# Patient Record
Sex: FEMALE | Race: WHITE | NOT HISPANIC OR LATINO | Employment: FULL TIME | URBAN - NONMETROPOLITAN AREA
[De-identification: names, ages, dates, MRNs, and addresses within clinical notes are randomized per-mention and may not be internally consistent; named-entity substitution may affect disease eponyms.]

---

## 2018-10-18 ENCOUNTER — OFFICE VISIT CONVERTED (OUTPATIENT)
Dept: FAMILY MEDICINE CLINIC | Age: 63
End: 2018-10-18
Attending: NURSE PRACTITIONER

## 2019-10-21 ENCOUNTER — HOSPITAL ENCOUNTER (OUTPATIENT)
Dept: OTHER | Facility: HOSPITAL | Age: 64
Discharge: HOME | End: 2019-10-21
Attending: NURSE PRACTITIONER

## 2019-10-21 ENCOUNTER — OFFICE VISIT CONVERTED (OUTPATIENT)
Dept: FAMILY MEDICINE CLINIC | Age: 64
End: 2019-10-21
Attending: NURSE PRACTITIONER

## 2019-10-21 LAB
25(OH)D3 SERPL-MCNC: 15.5 NG/ML (ref 30–100)
ALBUMIN SERPL-MCNC: 4.6 G/DL (ref 3.5–5)
ALBUMIN/GLOB SERPL: 1.7 {RATIO} (ref 1.4–2.6)
ALP SERPL-CCNC: 75 U/L (ref 43–160)
ALT SERPL-CCNC: 20 U/L (ref 10–40)
ANION GAP SERPL CALC-SCNC: 15 MMOL/L (ref 8–19)
AST SERPL-CCNC: 26 U/L (ref 15–50)
BASOPHILS # BLD MANUAL: 0.04 10*3/UL (ref 0–0.2)
BASOPHILS NFR BLD MANUAL: 0.8 % (ref 0–3)
BILIRUB SERPL-MCNC: 0.58 MG/DL (ref 0.2–1.3)
BUN SERPL-MCNC: 13 MG/DL (ref 5–25)
BUN/CREAT SERPL: 21 {RATIO} (ref 6–20)
CALCIUM SERPL-MCNC: 9.5 MG/DL (ref 8.7–10.4)
CHLORIDE SERPL-SCNC: 105 MMOL/L (ref 99–111)
CHOLEST SERPL-MCNC: 194 MG/DL (ref 107–200)
CHOLEST/HDLC SERPL: 3.8 {RATIO} (ref 3–6)
CONV CO2: 25 MMOL/L (ref 22–32)
CONV TOTAL PROTEIN: 7.3 G/DL (ref 6.3–8.2)
CREAT UR-MCNC: 0.62 MG/DL (ref 0.5–0.9)
DEPRECATED RDW RBC AUTO: 38.7 FL
EOSINOPHIL # BLD MANUAL: 0.12 10*3/UL (ref 0–0.7)
EOSINOPHIL NFR BLD MANUAL: 2.4 % (ref 0–7)
ERYTHROCYTE [DISTWIDTH] IN BLOOD BY AUTOMATED COUNT: 11.4 % (ref 11.5–14.5)
GFR SERPLBLD BASED ON 1.73 SQ M-ARVRAT: >60 ML/MIN/{1.73_M2}
GLOBULIN UR ELPH-MCNC: 2.7 G/DL (ref 2–3.5)
GLUCOSE SERPL-MCNC: 102 MG/DL (ref 65–99)
GRANS (ABSOLUTE): 2.91 10*3/UL (ref 2–8)
GRANS: 58.7 % (ref 30–85)
HBA1C MFR BLD: 13.5 G/DL (ref 12–16)
HCT VFR BLD AUTO: 40 % (ref 37–47)
HDLC SERPL-MCNC: 51 MG/DL (ref 40–60)
IMM GRANULOCYTES # BLD: 0 10*3/UL (ref 0–0.54)
IMM GRANULOCYTES NFR BLD: 0 % (ref 0–0.43)
LDLC SERPL CALC-MCNC: 128 MG/DL (ref 70–100)
LYMPHOCYTES # BLD MANUAL: 1.46 10*3/UL (ref 1–5)
LYMPHOCYTES NFR BLD MANUAL: 8.7 % (ref 3–10)
MCH RBC QN AUTO: 30.8 PG (ref 27–31)
MCHC RBC AUTO-ENTMCNC: 33.8 G/DL (ref 33–37)
MCV RBC AUTO: 91.3 FL (ref 81–99)
MONOCYTES # BLD AUTO: 0.43 10*3/UL (ref 0.2–1.2)
OSMOLALITY SERPL CALC.SUM OF ELEC: 292 MOSM/KG (ref 273–304)
PLATELET # BLD AUTO: 261 10*3/UL (ref 130–400)
PMV BLD AUTO: 9.6 FL (ref 7.4–10.4)
POTASSIUM SERPL-SCNC: 3.9 MMOL/L (ref 3.5–5.3)
RBC # BLD AUTO: 4.38 10*6/UL (ref 4.2–5.4)
SODIUM SERPL-SCNC: 141 MMOL/L (ref 135–147)
TRIGL SERPL-MCNC: 73 MG/DL (ref 40–150)
TSH SERPL-ACNC: 1.38 M[IU]/L (ref 0.27–4.2)
VARIANT LYMPHS NFR BLD MANUAL: 29.4 % (ref 20–45)
VLDLC SERPL-MCNC: 15 MG/DL (ref 5–37)
WBC # BLD AUTO: 4.96 10*3/UL (ref 4.8–10.8)

## 2019-10-23 LAB
CONV LAST MENSTURAL PERIOD: NORMAL
SPECIMEN SOURCE: NORMAL
SPECIMEN SOURCE: NORMAL
THIN PREP CVX: NORMAL

## 2019-11-07 ENCOUNTER — OFFICE VISIT CONVERTED (OUTPATIENT)
Dept: PODIATRY | Facility: CLINIC | Age: 64
End: 2019-11-07
Attending: PODIATRIST

## 2019-11-07 ENCOUNTER — CONVERSION ENCOUNTER (OUTPATIENT)
Dept: PODIATRY | Facility: CLINIC | Age: 64
End: 2019-11-07

## 2019-11-07 ENCOUNTER — HOSPITAL ENCOUNTER (OUTPATIENT)
Dept: OTHER | Facility: HOSPITAL | Age: 64
Discharge: HOME | End: 2019-11-07
Attending: PODIATRIST

## 2019-11-15 ENCOUNTER — HOSPITAL ENCOUNTER (OUTPATIENT)
Dept: OTHER | Facility: HOSPITAL | Age: 64
Discharge: HOME | End: 2019-11-15
Attending: NURSE PRACTITIONER

## 2020-01-08 ENCOUNTER — OFFICE VISIT CONVERTED (OUTPATIENT)
Dept: FAMILY MEDICINE CLINIC | Age: 65
End: 2020-01-08
Attending: NURSE PRACTITIONER

## 2020-01-08 ENCOUNTER — HOSPITAL ENCOUNTER (OUTPATIENT)
Dept: OTHER | Facility: HOSPITAL | Age: 65
Discharge: HOME | End: 2020-01-08
Attending: NURSE PRACTITIONER

## 2020-01-08 LAB
APPEARANCE UR: CLEAR
BILIRUB UR QL: NEGATIVE
COLOR UR: YELLOW
CONV BACTERIA: NEGATIVE
CONV COLLECTION SOURCE (UA): ABNORMAL
CONV UROBILINOGEN IN URINE BY AUTOMATED TEST STRIP: 0.2 {EHRLICHU}/DL (ref 0.1–1)
GLUCOSE UR QL: NEGATIVE MG/DL
HGB UR QL STRIP: ABNORMAL
KETONES UR QL STRIP: NEGATIVE MG/DL
LEUKOCYTE ESTERASE UR QL STRIP: ABNORMAL
NITRITE UR QL STRIP: NEGATIVE
PH UR STRIP.AUTO: 6.5 [PH] (ref 5–8)
PROT UR QL: NEGATIVE MG/DL
RBC #/AREA URNS HPF: ABNORMAL /[HPF]
SP GR UR: 1.01 (ref 1–1.03)
WBC #/AREA URNS HPF: ABNORMAL /[HPF]

## 2020-01-10 LAB — BACTERIA UR CULT: ABNORMAL

## 2020-01-22 ENCOUNTER — HOSPITAL ENCOUNTER (OUTPATIENT)
Dept: OTHER | Facility: HOSPITAL | Age: 65
Discharge: HOME | End: 2020-01-22
Attending: NURSE PRACTITIONER

## 2020-01-24 ENCOUNTER — HOSPITAL ENCOUNTER (OUTPATIENT)
Dept: OTHER | Facility: HOSPITAL | Age: 65
Discharge: HOME | End: 2020-01-24
Attending: NURSE PRACTITIONER

## 2020-01-24 LAB
APPEARANCE UR: CLEAR
BACTERIA UR QL AUTO: ABNORMAL
BILIRUB UR QL: NEGATIVE
CASTS URNS QL MICRO: ABNORMAL /[LPF]
COLOR UR: YELLOW
CONV LEUKOCYTE ESTERASE: NEGATIVE
CONV UROBILINOGEN IN URINE BY AUTOMATED TEST STRIP: 0.2 {EHRLICHU}/DL (ref 0.1–1)
EPI CELLS #/AREA URNS HPF: ABNORMAL /[HPF]
GLUCOSE 24H UR-MCNC: NEGATIVE MG/DL
HGB UR QL STRIP: ABNORMAL
KETONES UR QL STRIP: NEGATIVE MG/DL
MUCOUS THREADS URNS QL MICRO: ABNORMAL
NITRITE UR-MCNC: NEGATIVE MG/ML
PH UR STRIP.AUTO: 7 [PH] (ref 5–8)
PROT UR-MCNC: NEGATIVE MG/DL
RBC # BLD AUTO: ABNORMAL /[HPF]
SP GR UR STRIP: 1.01 (ref 1–1.03)
SPECIMEN SOURCE: ABNORMAL
UNIDENT CRYS URNS QL MICRO: ABNORMAL /[HPF]
WBC #/AREA URNS HPF: ABNORMAL /[HPF]

## 2020-01-27 ENCOUNTER — DOCUMENTATION (OUTPATIENT)
Dept: OBSTETRICS AND GYNECOLOGY | Facility: CLINIC | Age: 65
End: 2020-01-27

## 2020-01-27 ENCOUNTER — TELEPHONE (OUTPATIENT)
Dept: OBSTETRICS AND GYNECOLOGY | Facility: CLINIC | Age: 65
End: 2020-01-27

## 2020-01-27 ENCOUNTER — OFFICE VISIT (OUTPATIENT)
Dept: OBSTETRICS AND GYNECOLOGY | Facility: CLINIC | Age: 65
End: 2020-01-27

## 2020-01-27 VITALS
WEIGHT: 152.8 LBS | SYSTOLIC BLOOD PRESSURE: 122 MMHG | DIASTOLIC BLOOD PRESSURE: 82 MMHG | BODY MASS INDEX: 26.09 KG/M2 | HEIGHT: 64 IN

## 2020-01-27 DIAGNOSIS — N95.2 VAGINAL ATROPHY: ICD-10-CM

## 2020-01-27 DIAGNOSIS — N95.1 MENOPAUSAL SYMPTOMS: Primary | ICD-10-CM

## 2020-01-27 DIAGNOSIS — N84.1 ENDOCERVICAL POLYP: ICD-10-CM

## 2020-01-27 PROCEDURE — 99203 OFFICE O/P NEW LOW 30 MIN: CPT | Performed by: OBSTETRICS & GYNECOLOGY

## 2020-01-27 RX ORDER — CHOLECALCIFEROL (VITAMIN D3) 1250 MCG
1 CAPSULE ORAL WEEKLY
COMMUNITY
Start: 2020-01-07 | End: 2021-01-28 | Stop reason: DRUGHIGH

## 2020-01-27 NOTE — TELEPHONE ENCOUNTER
Spoke with patient and advised her that we need to have the mammogram report faxed to our office.  Also, patient was advised that she should use the premarin tonight and Wednesday and she should be able to have intercourse before using the cream and the day after she shouldn't have any problems.  Patient verbalized understanding and had no questions at this time.

## 2020-01-27 NOTE — PROGRESS NOTES
History and Physical    Chief Complaint: An endocervical polyp    Sayra Brock is a 64 y.o., , who presented to the office on 2020 with a history of an endocervical polyp.  She also had significant symptoms of vaginal atrophy and was started on Premarin vaginal cream.  She has had 2 vaginal deliveries and 2  sections.  She is scheduled for hysteroscopy/D&C/polypectomy with the MyoSure device.  I have explained the procedures to the patient including the surgical risks of uterine perforation, bleeding, infection, and anesthetic risks.  She voiced understanding of these risks.  Informed consent was signed.    Past Medical History:   Diagnosis Date   • Menopause    • Vitamin D deficiency        Allergies: Patient has no known allergies.    Medications:   Current Outpatient Medications:   •  Cholecalciferol (VITAMIN D3) 1.25 MG (47044 UT) capsule, Take 1 capsule by mouth 1 (One) Time Per Week., Disp: , Rfl:   •  conjugated estrogens (PREMARIN) 0.625 MG/GM vaginal cream, Insert 1.0 grams intravaginally, twice a week, Disp: 30 g, Rfl: 3    Previous Surgery:   Past Surgical History:   Procedure Laterality Date   • BREAST BIOPSY     •  SECTION      X 2   • D&C WITH SUCTION         Review of Systems  A comprehensive review of systems was negative.  Constitutional: negative for fever, chills, activity change, appetite change, fatigue and unexpected weight change.  Respiratory: negative  Cardiovascular: negative  Gastrointestinal: positive for incontinence of stool  Genitourinary:vaginal dryness  Musculoskeletal:negative  Behavioral/Psych: negative      Social History     Tobacco Use   • Smoking status: Former Smoker   • Smokeless tobacco: Never Used   Substance Use Topics   • Alcohol use: Yes     Comment: occasional       Recent Vitals       2020             BP:  122/82    Weight:  69.3 kg (152 lb 12.8 oz)    BMI (Calculated):  26.2          Physical Exam  General:  alert; cooperative; well  developed; well nourished   Skin:  No suspicious lesions seen   Thyroid: normal to inspection and palpation   Lungs:  clear to auscultation bilaterally   Heart:  regular rate and rhythm, S1, S2 normal, no murmur, click, rub or gallop   Breasts:  Not performed.   Abdomen: soft, non-tender; no masses  no umbilical or inguinal hernias are present  no hepato-splenomegaly   Pelvis: Clinical staff was present for exam  External genitalia:  normal appearance of the external genitalia including Bartholin's and [place]inos[place]'s glands.  Vaginal:  normal pink mucosa without prolapse or lesions.  Cervix:  endocervical polyp  Uterus:  normal size, shape and consistency. anteverted;  Adnexa:  non palpable bilaterally.  Rectal:  anus visually normal appearing. recto-vaginal exam unremarkable and confirms findings;         The surgical risks of uterine perforation, bleeding, infection and anesthestic risks were explained to the patient and she voiced understanding. Informed consent was signed.    No contraindications to planned surgery were detected      Impression: 1) Endocervical polyp        Plan: 1) Hysteroscopy/D&C/Polypectomy (Myosure Reach)      *Please note that portions of this documentation may have been completed with a voice recognition program.  Efforts were made to edit this dictation, but occasional words may have been mistranscribed.  This note was electronically signed.    ALANA Laird MD  January 27, 2020  4:36 PM

## 2020-01-27 NOTE — PROGRESS NOTES
"   Chief Complaint   Patient presents with   • Gynecologic Exam     endocervical polyp   • Menopause     c/o menopausal symptoms, vaginal dryness and itching       Sayra Brock is a 64 y.o. year old  presenting to be seen for a new gynecologic visit with complaints of vaginal dryness and vulvar irritation as well as a history of an endocervical polyp.  She has had hot flashes, and night sweats off and on since age 53.  She is not treated with estrogen/progestin replacement therapy.  Her vaginal dryness and irritation has become progressively worse.  She has had 2 vaginal deliveries and 2  sections.  She has had a suction D&C for an incomplete AB.  She has had a benign breast biopsy.  She denies urinary symptoms.  She has incomplete expelling of stool.    Social History     Substance and Sexual Activity   Sexual Activity Yes   • Partners: Male   • Birth control/protection: Post-menopausal     SCREENING TESTS    Year 2012   Age                         PAP        \"Neg\"                 HPV high risk                         Mammogram        \"benign\"                 MADDIE score                         Breast MRI                         Lipids                         Vitamin D                         Colonoscopy                         DEXA  Frax (hip/any)                         Ovarian Screen                             No Additional Complaints Reported    The following portions of the patient's history were reviewed and updated as appropriate:vital signs and   She  has a past medical history of Menopause and Vitamin D deficiency.  She does not have any pertinent problems on file.  She  has a past surgical history that includes d&c with suction;  section; and Breast biopsy.  Her family history includes Breast cancer in her maternal aunt and mother.  She  reports that she has quit smoking. She has " "never used smokeless tobacco. She reports that she drinks alcohol. She reports that she does not use drugs.  Current Outpatient Medications   Medication Sig Dispense Refill   • Cholecalciferol (VITAMIN D3) 1.25 MG (87993 UT) capsule Take 1 capsule by mouth 1 (One) Time Per Week.     • conjugated estrogens (PREMARIN) 0.625 MG/GM vaginal cream Insert 1.0 grams intravaginally, twice a week 30 g 3     No current facility-administered medications for this visit.      She has No Known Allergies..        Review of Systems  A comprehensive review of systems was done.  Constitutional: negative for fever, chills, activity change, appetite change, fatigue and unexpected weight change.  Respiratory: negative  Cardiovascular: negative  Gastrointestinal: positive for incontinence of stool  Genitourinary:positive for vaginal dryness  Musculoskeletal:negative  Behavioral/Psych: negative          /82   Ht 162.6 cm (64\")   Wt 69.3 kg (152 lb 12.8 oz)   Breastfeeding No   BMI 26.23 kg/m²     Physical Exam    General:  alert; cooperative; well developed; well nourished   Skin:  No suspicious lesions seen   Thyroid: normal to inspection and palpation   Lungs:  clear to auscultation bilaterally   Heart:  regular rate and rhythm, S1, S2 normal, no murmur, click, rub or gallop   Breasts:  Not performed.  Not desired   Abdomen: soft, non-tender; no masses  no umbilical or inguinal hernias are present  no hepato-splenomegaly   Pelvis: Clinical staff was present for exam  External genitalia:  normal appearance of the external genitalia including Bartholin's and St[place]'s glands.  Vaginal:  atrophic mucosal changes are present;  Cervix:  Endocervical polyp present  Uterus:  normal size, shape and consistency. anteverted;  Adnexa:  non palpable bilaterally.  Rectal:  external hemorrhoids present;     Lab Review   No data reviewed    Imaging   No data reviewed    Medical counseling was given to patient for the following topics: " diagnostic results, instructions for management, risk factor reductions, prognosis, impressions, risks and benefits of treatment options and importance of treatment compliance . Total time of the encounter was 32 minute(s) and 21 minute(s)  was spent in face to face counseling.  I have counseled the patient that she has severe vaginal atrophy and has no evidence of vaginitis.  I have recommended that she use a local estrogen product and she prefers to use a cream.  She has questions about the safety of this product and I have answered those.  I have advised her that the package insert information is based on systemic estrogen therapy rather than local vaginal estrogen therapy.  I have counseled her of the breast and cardiovascular risks of systemic estrogen therapy.  I have recommended that she use estrogen cream intravaginally twice a week.  I have counseled her that it may take 6-8 weeks before she notices improvement.  I have advised her to continue on this medication indefinitely.  I have counseled her that she has an endocervical polyp.  I have counseled her about hysteroscopy with D&C and polypectomy.  I have advised her of the surgical risks of uterine perforation, bleeding, infection, and anesthetic risks.  She voiced understanding of these risks and desires to be scheduled at [facility].          ASSESSMENT  Problems Addressed this Visit        Genitourinary    Vaginal atrophy    Relevant Medications    conjugated estrogens (PREMARIN) 0.625 MG/GM vaginal cream    Endocervical polyp      Other Visit Diagnoses     Menopausal symptoms    -  Primary           Substance History:    reports that she has quit smoking. She has never used smokeless tobacco.    reports that she drinks alcohol.    reports that she does not use drugs.    Substance use counseling is not indicated based on patient history.  She indicates that her alcohol intake is social, and controlled.      PLAN    Medications prescribed this encounter:     New Medications Ordered This Visit   Medications   • conjugated estrogens (PREMARIN) 0.625 MG/GM vaginal cream     Sig: Insert 1.0 grams intravaginally, twice a week     Dispense:  30 g     Refill:  3   · Monthly self breast assessment and annual breast imaging  · The patient will be scheduled for hysteroscopy/D&C/endocervical and or endometrial polypectomy with the MyoSure device.  Pamphlets were given about these procedures.  · Follow up for surgery on 1/31/2020  · Calcium, 600 mg/ Vit. D, 400 IU daily     *Please note that portions of this documentation may have been completed with a voice recognition program.  Efforts were made to edit this dictation, but occasional words may have been mistranscribed.     This note was electronically signed.    ALANA Laird MD  January 27, 2020  10:17 AM

## 2020-01-27 NOTE — TELEPHONE ENCOUNTER
I had asked the patient to have her mammogram report Faxed to our office.  I would recommend that she insert Premarin cream tonight and Wednesday night.  Regarding intercourse, she may insert Premarin cream after having intercourse.  She may have intercourse the day after inserting the cream without any problems

## 2020-01-27 NOTE — TELEPHONE ENCOUNTER
Patient called and wanted to know exactly what Dr. Laird was needing her to ask the mammography office.  She wasn't sure exactly what he was wanting to know.  She was also wanting to know how long to use the premarin cream.  How long should she wait to have intercourse after starting the cream.  Since she is supposed to use the cream every Monday and Thursday should she do a different day this week since she has to skip using it Thursday because of her surgery on Friday.

## 2020-01-28 ENCOUNTER — TELEPHONE (OUTPATIENT)
Dept: OBSTETRICS AND GYNECOLOGY | Facility: CLINIC | Age: 65
End: 2020-01-28

## 2020-01-28 NOTE — TELEPHONE ENCOUNTER
Spoke with patient and she wanted to know if she should stop using her fungal ointment on her feet the night before her surgery and if she could use some preparation H before her surgery.    Please advise.

## 2020-01-28 NOTE — TELEPHONE ENCOUNTER
The patient may use the antifungal ointment on her feet the night prior to surgery.  She may use Preparation H as well.

## 2020-01-28 NOTE — TELEPHONE ENCOUNTER
Spoke with patient and advised her that her surgery would be on Friday 1/31/2020 at 0830.  Patient verbalized understanding.

## 2020-01-29 NOTE — TELEPHONE ENCOUNTER
Spoke with patient and advised her that she was able to use the antifungal cream and the Preparation H the night before surgery.  Patient verbalized understanding and had no questions at this time.

## 2020-01-31 ENCOUNTER — OUTSIDE FACILITY SERVICE (OUTPATIENT)
Dept: OBSTETRICS AND GYNECOLOGY | Facility: CLINIC | Age: 65
End: 2020-01-31

## 2020-01-31 ENCOUNTER — LAB REQUISITION (OUTPATIENT)
Dept: LAB | Facility: HOSPITAL | Age: 65
End: 2020-01-31

## 2020-01-31 DIAGNOSIS — N84.1 POLYP OF CERVIX UTERI: ICD-10-CM

## 2020-01-31 PROCEDURE — 88305 TISSUE EXAM BY PATHOLOGIST: CPT | Performed by: OBSTETRICS & GYNECOLOGY

## 2020-01-31 PROCEDURE — 58558 HYSTEROSCOPY BIOPSY: CPT | Performed by: OBSTETRICS & GYNECOLOGY

## 2020-02-04 LAB
CYTO UR: NORMAL
LAB AP CASE REPORT: NORMAL
LAB AP CLINICAL INFORMATION: NORMAL
LAB AP DIAGNOSIS COMMENT: NORMAL
PATH REPORT.FINAL DX SPEC: NORMAL
PATH REPORT.GROSS SPEC: NORMAL

## 2020-02-27 ENCOUNTER — OFFICE VISIT (OUTPATIENT)
Dept: OBSTETRICS AND GYNECOLOGY | Facility: CLINIC | Age: 65
End: 2020-02-27

## 2020-02-27 VITALS
SYSTOLIC BLOOD PRESSURE: 150 MMHG | DIASTOLIC BLOOD PRESSURE: 84 MMHG | WEIGHT: 154 LBS | HEIGHT: 64 IN | BODY MASS INDEX: 26.29 KG/M2

## 2020-02-27 DIAGNOSIS — N95.2 VAGINAL ATROPHY: ICD-10-CM

## 2020-02-27 DIAGNOSIS — Z09 POSTOPERATIVE FOLLOW-UP: Primary | ICD-10-CM

## 2020-02-27 PROCEDURE — 99024 POSTOP FOLLOW-UP VISIT: CPT | Performed by: OBSTETRICS & GYNECOLOGY

## 2020-02-27 NOTE — PROGRESS NOTES
Chief Complaint   Patient presents with   • Post-op       Sayra Brock is a 64 y.o. year old  presenting to be seen for her post-operative visit following a hysteroscopy/MyoSure D&C with an endocervical polypectomy and endometrial polypectomy.  Both polyps were benign.    Procedure:  Hysteroscopy/D & C, Uterine Polypectomy and Endocervical polypectomy    ROS:  A comprehensive review of systems was negative.  Constitutional: negative for fever, chills, activity change, appetite change, fatigue and unexpected weight change.  Respiratory: negative   Cardiovascular: negative  Gastrointestinal: negative  Genitourinary:negative  Musculoskeletal:negative  Behavioral/Psych: negative      Symptoms:  Nausea/Vomiting    No, Normal Bowel Function  Yes, Normal Urinary Function  Yes and Abnormal Discharge   No    No bleeding        Physical Exam:  Lungs:  Normal expansion.  Clear to auscultation.  No rales, rhonchi, or wheezing.  Abdomen:  Soft, non-tender, normal bowel sounds; no bruits, organomegaly or masses.   Pelvic:  Clinical staff was present for exam  External genitalia:  normal appearance of the external genitalia including Bartholin's and [place]'s glands.  Vaginal:  normal pink mucosa without prolapse or lesions.  Cervix:  normal appearance.  Uterus:  normal size, shape and consistency. anteverted;  Adnexa:  non palpable bilaterally.          Impression: 1) S/P Hysteroscopy/D&C/Polypectomies for benign endocervical and endometrial polyps       PLAN:  1. Follow up: 11 month(s) for AG  2. Continue using Premarin vaginal cream, 0.5 g intravaginally twice a week  *Please note that portions of this documentation may have been completed with a voice recognition program.  Efforts were made to edit this dictation, but occasional words may have been mistranscribed.      This note was electronically signed.    ALANA Laird MD  2020  2:06 PM

## 2020-03-06 ENCOUNTER — TELEPHONE (OUTPATIENT)
Dept: OBSTETRICS AND GYNECOLOGY | Facility: CLINIC | Age: 65
End: 2020-03-06

## 2020-03-06 NOTE — TELEPHONE ENCOUNTER
Patient has been informed that she may continue using 1 gram of Premarin vaginal cream 2 X week.  A new prescription with these instructions has been sent to [facility].  She will use OTC hydrocortisone cream 1% PRN to vulva and will come in if vulvar itching/irritation persists.  She has been told that if she needs Premarin vaginal cream before she is allowed to refill, due to incorrect prescription from our office, that we would supply her with samples.  The patient understands and will call if she needs to come in for an exam.

## 2020-03-06 NOTE — TELEPHONE ENCOUNTER
May continue using Premarin vaginal cream and a dose of 1.0 g intravaginally twice a week rather than 0.5 g.  I would recommend that she get 1% hydrocortisone cream to use externally on a daily basis.  If the external irritation does not resolve the patient should be seen in the office.

## 2020-03-06 NOTE — TELEPHONE ENCOUNTER
Patient calls today questioning directions/dosage on her most recent prescription for Premarin vaginal cream.  The patient has been using 1 gram by vaginal route 2 X week.  Her new prescription was sent to [facility] with directions 0.5 grams by vaginal route 2 X week.  The patient has not started the decreased dose and wonders if this is correct?    She is also complaining of some external vulvar itching.  She has not used any medication for this.  No increased vaginal discharge.  ? OTC hydrocortisone cream PRN?    Please advise.

## 2020-08-26 ENCOUNTER — TELEPHONE (OUTPATIENT)
Dept: OBSTETRICS AND GYNECOLOGY | Facility: CLINIC | Age: 65
End: 2020-08-26

## 2020-08-26 RX ORDER — ESTRADIOL 0.1 MG/G
1 CREAM VAGINAL 2 TIMES WEEKLY
Qty: 42.5 G | Refills: 2 | Status: SHIPPED | OUTPATIENT
Start: 2020-08-27 | End: 2021-08-31 | Stop reason: SDUPTHER

## 2020-08-26 NOTE — TELEPHONE ENCOUNTER
Patient's insurance has advised her that it would be cheaper for her to use estradiol vaginal cream in place of Premarin vaginal cream, and the patient calls today to request that this change be made.

## 2020-08-27 ENCOUNTER — TELEPHONE (OUTPATIENT)
Dept: OBSTETRICS AND GYNECOLOGY | Facility: CLINIC | Age: 65
End: 2020-08-27

## 2020-08-27 NOTE — TELEPHONE ENCOUNTER
Patient called yesterday and requested we change her from Premarin vaginal cream to estradiol vaginal cream.  This was recommended to the patient by [facility].  A prescription for estradiol vaginal cream was sent electronically to [facility] yesterday.  Today [facility] calls and requests a call back.  I spoke with Fam, who was asking about Premarin cream.  I let him know that we sent in a new prescription yesterday for estradiol vaginal cream.  He states that he sees that prescription, will cancel the prescription for Premarin vaginal cream, and will fill the estradiol vaginal cream.  The patient has been informed.

## 2021-01-28 ENCOUNTER — OFFICE VISIT (OUTPATIENT)
Dept: OBSTETRICS AND GYNECOLOGY | Facility: CLINIC | Age: 66
End: 2021-01-28

## 2021-01-28 VITALS
DIASTOLIC BLOOD PRESSURE: 80 MMHG | BODY MASS INDEX: 28.17 KG/M2 | SYSTOLIC BLOOD PRESSURE: 128 MMHG | HEIGHT: 64 IN | WEIGHT: 165 LBS

## 2021-01-28 DIAGNOSIS — Z78.0 MENOPAUSE: Primary | ICD-10-CM

## 2021-01-28 DIAGNOSIS — Z01.419 ENCOUNTER FOR GYNECOLOGICAL EXAMINATION WITHOUT ABNORMAL FINDING: ICD-10-CM

## 2021-01-28 DIAGNOSIS — R30.0 DYSURIA: ICD-10-CM

## 2021-01-28 DIAGNOSIS — N95.2 VAGINAL ATROPHY: ICD-10-CM

## 2021-01-28 PROBLEM — N84.1 ENDOCERVICAL POLYP: Status: RESOLVED | Noted: 2020-01-27 | Resolved: 2021-01-28

## 2021-01-28 LAB
BILIRUB UR QL STRIP: NEGATIVE
CLARITY UR: CLEAR
COLOR UR: YELLOW
GLUCOSE UR STRIP-MCNC: NEGATIVE MG/DL
HGB UR QL STRIP.AUTO: NEGATIVE
KETONES UR QL STRIP: NEGATIVE
LEUKOCYTE ESTERASE UR QL STRIP.AUTO: NEGATIVE
NITRITE UR QL STRIP: NEGATIVE
PH UR STRIP.AUTO: 7 [PH] (ref 5–8)
PROT UR QL STRIP: NEGATIVE
SP GR UR STRIP: 1.01 (ref 1–1.03)
UROBILINOGEN UR QL STRIP: NORMAL

## 2021-01-28 PROCEDURE — 81003 URINALYSIS AUTO W/O SCOPE: CPT | Performed by: OBSTETRICS & GYNECOLOGY

## 2021-01-28 PROCEDURE — 99396 PREV VISIT EST AGE 40-64: CPT | Performed by: OBSTETRICS & GYNECOLOGY

## 2021-01-28 NOTE — PROGRESS NOTES
Chief Complaint   Patient presents with   • Annual Exam     ? occasional spotting?  unsure where bleeding is coming from.  patient desires cath u/a today.   • Hemorrhoids   • Med Refill     will call for prescription refill.       Sayra Brock is a 65 y.o. year old  presenting to be seen for her annual exam.  This patient is menopausal and does not use systemic estrogen/progestin replacement therapy.  She is treated for vaginal atrophy with estradiol cream, 1 g intravaginally twice a week.  She has relief of symptoms and has no side effects on this medication.  In 2020 I did a hysteroscopy/D&C/endocervical and endometrial polypectomy.  The tissue was benign.  The patient's mother has a history of breast cancer, and  of ALS.  She has noted some recent dysuria.  She desires screening for cystitis.  She has trouble with rectal hygiene.    SCREENING TESTS    Year 2012   Age                         PAP        Neg.                 HPV high risk                         Mammogram        benign                 MADDIE score                         Breast MRI                         Lipids                         Vitamin D                         Colonoscopy                         DEXA  Frax (hip/any)                         Ovarian Screen                             She exercises regularly: yes.  She wears her seat belt: yes.  She has concerns about domestic violence: no.  She has noticed changes in height: no    GYN screening history:  · Last pap: was done on approximately 10/21/2019 and the result was: normal PAP.  · Last mammogram: was done on approximately 2019 and the result was: Birads I (Normal)..    No Additional Complaints Reported    The following portions of the patient's history were reviewed and updated as appropriate:vital signs and   She  has a past medical history of Menopause and  "Vitamin D deficiency.  She does not have any pertinent problems on file.  She  has a past surgical history that includes d&c with suction;  section; Breast biopsy; and d&c hysteroscopy myosure.  Her family history includes Breast cancer in her maternal aunt and mother.  She  reports that she has quit smoking. She has never used smokeless tobacco. She reports current alcohol use. She reports that she does not use drugs.  Current Outpatient Medications   Medication Sig Dispense Refill   • vitamin D3 (Vitamin D) 125 MCG (5000 UT) capsule capsule Take 5,000 Units by mouth Daily.     • estradiol (ESTRACE) 0.1 MG/GM vaginal cream Insert 1 g into the vagina 2 (Two) Times a Week. 42.5 g 2     No current facility-administered medications for this visit.      She has No Known Allergies..    Review of Systems  A review of systems was taken.  She denies cough, fever, shortness of breath, and loss of her sense of taste or smell  Constitutional: negative for fever, chills, activity change, appetite change, fatigue and unexpected weight change.  Respiratory: negative  Cardiovascular: negative  Gastrointestinal: negative  Genitourinary:dysuria  Musculoskeletal:negative  Behavioral/Psych: negative     Counseling/Anticipatory Guidance Discussed: nutrition, physical activity, healthy weight, immunizations, screenings and self-breast exam      /80   Ht 162.6 cm (64\")   Wt 74.8 kg (165 lb)   Breastfeeding No   BMI 28.32 kg/m²     MEDICALLY INDICATED   Physical Exam    General:  alert; cooperative; well developed; well nourished   Skin:  No suspicious lesions seen   Thyroid: normal to inspection and palpation   Lungs:  breathing is unlabored  clear to auscultation bilaterally   Heart:  regular rate and rhythm, S1, S2 normal, no murmur, click, rub or gallop  normal apical impulse   Breasts:  Examined in supine position  Symmetric without masses or skin dimpling  Nipples normal without inversion, lesions or " discharge  There are no palpable axillary nodes   Abdomen: soft, non-tender; no masses  no umbilical or inguinal hernias are present  no hepato-splenomegaly   Pelvis: Clinical staff was present for exam  External genitalia:  normal appearance of the external genitalia including Bartholin's and [place]'s glands.  Vaginal:  normal pink mucosa without prolapse or lesions.  Cervix:  normal appearance.  Uterus:  normal size, shape and consistency. anteverted;  Adnexa:  non palpable bilaterally.  Rectal:  anus visually normal appearing. recto-vaginal exam unremarkable and confirms findings;     Lab Review   Pap results    Imaging  Pelvic ultrasound results and Mammogram results        Advance directives- YES      ASSESSMENT  Problems Addressed this Visit        Other    Vaginal atrophy    Menopause - Primary      Other Visit Diagnoses     Encounter for gynecological examination without abnormal finding        Dysuria        Relevant Orders    Urinalysis With Culture If Indicated - Urine, Catheter In/Out      Diagnoses       Codes Comments    Menopause    -  Primary ICD-10-CM: Z78.0  ICD-9-CM: 627.2     Vaginal atrophy     ICD-10-CM: N95.2  ICD-9-CM: 627.3     Encounter for gynecological examination without abnormal finding     ICD-10-CM: Z01.419  ICD-9-CM: V72.31     Dysuria     ICD-10-CM: R30.0  ICD-9-CM: 788.1               Substance History:   reports that she has quit smoking. She has never used smokeless tobacco.   reports current alcohol use.   reports no history of drug use.    Substance use counseling is not indicated based on patient history.  The patient indicates that her alcohol intake is social, and controlled.      PLAN    · Medications prescribed this encounter:  No orders of the defined types were placed in this encounter.  · Monthly self breast assessment and annual breast imaging  · Continue use of estradiol cream, 1 g intravaginally twice a week  · Catheterized urinalysis obtained  · Calcium, 600 mg/ Vit.  D, 400 IU daily; regular weight-bearing exercise  · Follow up: 12 month(s)  *Please note that portions of this documentation may have been completed with a voice recognition program.  Efforts were made to edit this dictation, but occasional words may have been mistranscribed.       This note was electronically signed.    ALANA Laird MD  January 28, 2021  10:27 EST

## 2021-04-23 ENCOUNTER — HOSPITAL ENCOUNTER (OUTPATIENT)
Dept: OTHER | Facility: HOSPITAL | Age: 66
Discharge: HOME | End: 2021-04-23

## 2021-05-15 VITALS
SYSTOLIC BLOOD PRESSURE: 117 MMHG | BODY MASS INDEX: 25.61 KG/M2 | DIASTOLIC BLOOD PRESSURE: 66 MMHG | WEIGHT: 150 LBS | HEIGHT: 64 IN | HEART RATE: 58 BPM | OXYGEN SATURATION: 98 %

## 2021-05-18 NOTE — PROGRESS NOTES
Sayra Brock  1955     Office/Outpatient Visit    Visit Date:  04:29 pm    Provider: Adelaide Hernandez N.P. (Assistant: Fallon Calabrese RN)    Location: [facility]        Electronically signed by Adelaide Hernandez N.P. on  2020 06:23:32 PM                             Subjective:        CC: Nadia is a 64 year old White female.  Vaginal discharge;         HPI:           PHQ-9 Depression Screening: Completed form scanned and in chart; Total Score 0           Additionally, she presents with history of acute vaginitis.  the presenting complaint is vaginal irritation and white discharge.  These have been present for the past 3 days.  Associated symptoms include vaginal itching.  She denies associated vaginal odor, dyspareunia, fever, abdominal pain or pelvic pain.  This has not been previously treated.  She is sexually active in a monogamous relationship.  She denies any history of sexually transmitted diseases.      ROS:     CONSTITUTIONAL:  Negative for chills, fatigue, fever, and weight change.      CARDIOVASCULAR:  Negative for chest pain, orthopnea, paroxysmal nocturnal dyspnea and pedal edema.      RESPIRATORY:  Negative for dyspnea.      GASTROINTESTINAL:  Negative for abdominal pain, constipation, diarrhea, nausea and vomiting.      GENITOURINARY:  Positive for vaginal discharge and vaginal itching.      PSYCHIATRIC:  Negative for anxiety, depression, and sleep disturbances.          Past Medical History / Family History / Social History:         Last Reviewed on 2020 05:19 PM by Adelaide Hernanedz    Past Medical History:             PAST MEDICAL HISTORY             GYNECOLOGICAL HISTORY:        Menopause at age 55.    Last Pap was 10/21/19      normal         PREVENTIVE HEALTH MAINTENANCE             COLORECTAL CANCER SCREENING: Up to date (colonoscopy q10y; sigmoidoscopy q5y; Cologuard q3y) was last done 19, Results are in chart; colonoscopy with  normal results     Hepatitis C Medicare Screening: was last done 10-18-18; negative         Surgical History:         Tonsillectomy + Adenoidectomy      section: X 2;     Breast Bs age 21; Procedures: colonoscopy /hemorrhoid         Family History:     Father:  at age 82; Cause of death was AAA;  Congestive Heart Failure;  Renal Failure ( age 82 ); ;  Type 2 Diabetes     Mother:  at age 82; Cause of death was ALS/Sierra Gerhrig's;  Breast Cancer ( Pagets Dz );     Brother(s): 7 brother(s) total;  Coronary Artery Disease; Hypertension     Sister(s): 2 sister(s) total;  Hypertension;  Anxiety     Son(s): 3 son(s) total     Daughter(s): Healthy; 1 daughter(s) total     Positive for Coronary Artery Disease ( pat. uncle; pat. aunt ).      Positive for Pancreatic Cancer ( pat. aunt; paternal cousins X 2 );         Social History: daughter is Fallon /MD oncologist in [place], [place]     Occupation: Homemaker     Marital Status:      Children: 4 children         Tobacco/Alcohol/Supplements:     Last Reviewed on 2020 05:19 PM by Adelaide Hernandez    Tobacco: She has a past history of cigarette smoking; quit date:    pack pack yr.  Non-drinker         Current Problems:     Last Reviewed on 2020 05:19 PM by Adelaide Hernandez    Vitamin D deficiency, unspecified    Acute vaginitis    Encounter for screening for depression        Immunizations:     Havrix -adult dose (HepA) 10/18/2018    Hep A, adult dose 4/15/2019    Fluzone Quadrivalent (3+ years) 10/18/2018    Fluzone Quadrivalent (3+ years) 10/3/2019    Flucelvax pf 2014    Adacel (Tdap) 2009    Shingrix (Zoster recombinant) 10/21/2019        Allergies:     Last Reviewed on 2020 05:19 PM by Adelaide Hernandez    No Known Allergies.        Current Medications:     Last Reviewed on 2020 05:19 PM by Adelaide Hernandez    Econazole Nitrate 1% Cream [Apply sufficient amount to affected area bid]    Vitamin D3 50,000IU  Capsules [1 cap weekly for 3mos & then start 5000 IU's daily]        Objective:        Vitals:         Current: 1/8/2020 4:34:48 PM    Ht:  5 ft, 2.75 in;  Wt: 150.4 lbs;  BMI: 26.9T: 98.3 F (oral);  BP: 132/67 mm Hg (left arm, sitting);  P: 56 bpm (left arm (BP Cuff), sitting);  sCr: 0.62 mg/dL;  GFR: 92.31        Exams:     PHYSICAL EXAM:     GENERAL: vital signs recorded - well developed, well nourished;  no apparent distress;     RESPIRATORY: normal respiratory rate and pattern with no distress; normal breath sounds with no rales, rhonchi, wheezes or rubs;     CARDIOVASCULAR: normal rate; rhythm is regular;  no systolic murmur; no edema;     GASTROINTESTINAL: nontender; normal bowel sounds; no organomegaly;     GENITOURINARY: external genitalia: Mild erythema around hair on labia majoria; ; vagina: purulent vaginal discharge;     NEUROLOGIC: GROSSLY INTACT     PSYCHIATRIC:  appropriate affect and demeanor; normal speech pattern; grossly normal memory;         Assessment:         Z13.31   Encounter for screening for depression       N76.0   Acute vaginitis           ORDERS:         Lab Orders:       [id number] CANDIDA,BETY, & TRICH [id number]  (Send-Out)            12945  [facility] - [facility] [place] [facility] Urinalysis, automated, with micro  (Send-Out)              Other Orders:         Depression screen negative  (In-House)                      Plan:         Encounter for screening for depression    MIPS PHQ-9 Depression Screening: Completed form scanned and in chart; Total Score 0; Negative Depression Screen           Orders:         Depression screen negative  (In-House)              Acute vaginitis    LABORATORY:  Labs ordered to be performed today include urinalysis with micro and Vaginal Screen.      RECOMMENDATIONS given include: Further recommendation to be given after test results are complete.      FOLLOW-UP: Schedule follow-up appointments on a p.r.n. basis.:.            Orders:       50525  [facility] -  [facility] VAG SCREEN CANDIDA,BETY, & TRICH [id number]  (Send-Out)            08415  [facility] - [facility] Urinalysis, automated, with micro  (Send-Out)                  Patient Recommendations:        For  Acute vaginitis:    Schedule follow-up appointments as needed.                APPOINTMENT INFORMATION:        Monday Tuesday Wednesday Thursday Friday Saturday Sunday            Time:___________________AM  PM   Date:_____________________             Charge Capture:         Primary Diagnosis:     Z13.31  Encounter for screening for depression           Orders:      29477  Office/outpatient visit; established patient, level 3  (In-House)              Depression screen negative  (In-House)              N76.0  Acute vaginitis

## 2021-05-18 NOTE — PROGRESS NOTES
Sayra Brock 1955     Office/Outpatient Visit    Visit Date: Thu, Oct 18, 2018 09:15 am    Provider: Shreya Hare N.P. (Assistant: Alta Leroy MA)    Location: [facility]        Electronically signed by Shreya Hare N.P. on  10/18/2018 11:40:22 AM                             SUBJECTIVE:        CC:     Nadia is a 62 year old White female.  WWE         HPI:         Well Woman Exam noted.  Her last physical exam was 2 years ago.  She is menopausal.  She performs breast self-exams occasionally.    Her last Pap smear was in 11-21-16 and was normal.   Her last mammogram was in 11-26-16 and was normal.   Her last DEXA was in 8-20-13 and showed osteopenia.   Preventative Health updated today.  Nadia has had an abnormal Pap smear in the past.  Tobacco: Past history of cigarette smoking, but has quit.              PHQ-9 Depression Screening: Completed form scanned and in chart; Total Score 2 Alcohol Consumption Screening: Completed form scanned and in chart; Total Score 1     ROS:     CONSTITUTIONAL:  Negative for chills, fatigue, fever, and weight change.      EYES:  Negative for blurred vision, eye pain, and photophobia.      E/N/T:  Negative for hearing problems, E/N/T pain, congestion, rhinorrhea, epistaxis, hoarseness, and dental problems.      CARDIOVASCULAR:  Negative for chest pain, palpitations, tachycardia, orthopnea, and edema.      RESPIRATORY:  Negative for cough, dyspnea, and hemoptysis.      GASTROINTESTINAL:  Negative for abdominal pain, heartburn, constipation, diarrhea, and stool changes.      GENITOURINARY:  Negative for dysuria, post-menopausal vaginal bleeding and vaginal discharge.      MUSCULOSKELETAL:  Negative for arthralgias, back pain, and myalgias.      INTEGUMENTARY/BREAST:  Negative for atypical moles, dry skin, pruritis, rashes, breast masses, and nipple discharge.      NEUROLOGICAL:  Negative for dizziness, headaches, paresthesias, and weakness.       ENDOCRINE:  Negative for hair loss, heat/cold intolerance, polydipsia, and polyphagia.      PSYCHIATRIC:  Negative for anxiety, depression, and sleep disturbances.          PMH/FMH/SH:     Last Reviewed on 10/18/2018 11:26 AM by Shreya Hare    Past Medical History:             GYNECOLOGICAL HISTORY:        Menopause at age 55.          Surgical History:         Tonsillectomy + Adenoidectomy       section: X 2;     Breast Bs age 21; Procedures: colonoscopy /hemorrhoid         Family History:     Father:  at age 82; Cause of death was AAA;  Congestive Heart Failure;  Renal Failure ( age 82 ); ;  Type 2 Diabetes     Mother:  at age 82; Cause of death was ALS/Sierra Gerhrig's;  Breast Cancer ( Pagets Dz );     Brother(s): 7 brother(s) total;  Coronary Artery Disease; Hypertension     Sister(s): 2 sister(s) total;  Hypertension;  Anxiety     Son(s): 3 son(s) total     Daughter(s): Healthy; 1 daughter(s) total     Positive for Coronary Artery Disease ( pat. uncle; pat. aunt ).      Positive for Pancreatic Cancer ( pat. aunt; paternal cousins X 2 );         Social History: daughter is Fallon /MD oncologist in [place], [place]     Occupation: Homemaker     Marital Status:      Children: 4 children         Tobacco/Alcohol/Supplements:     Last Reviewed on 10/18/2018 09:17 AM by Alta Leroy    Tobacco: She has a past history of cigarette smoking; quit date:  -   pack pack yr.  Non-drinker         Substance Abuse History:     None             Immunizations:     Flucelvax pf 2014     Adacel (Tdap) 2009         Allergies:     Last Reviewed on 10/18/2018 09:17 AM by Alta Leroy      No Known Drug Allergies.         Current Medications:     Last Reviewed on 10/18/2018 09:17 AM by Alta Leroy    None        OBJECTIVE:        Vitals:         Current: 10/18/2018 9:21:36 AM    Ht:  5 ft, 2.75 in;  Wt: 150.4 lbs;  BMI: 26.9    T: 97.9 F (oral);  BP: 135/69 mm  Hg (left arm, sitting);  P: 52 bpm (left arm (BP Cuff), sitting);  sCr: 0.62 mg/dL;  GFR: 94.60        Exams:     PHYSICAL EXAM:     GENERAL: vital signs recorded - well developed, well nourished;  no apparent distress;     EYES: extraocular movements intact; conjunctiva and cornea are normal; PERRLA;     E/N/T:  normal EACs, TMs, nasal/oral mucosa, teeth, gingiva, and oropharynx;     NECK: range of motion is normal; thyroid is non-palpable;     RESPIRATORY: normal respiratory rate and pattern with no distress; normal breath sounds with no rales, rhonchi, wheezes or rubs;     CARDIOVASCULAR: normal rate; rhythm is regular;  no systolic murmur; no edema;     GASTROINTESTINAL: nontender; normal bowel sounds; no organomegaly; rectal exam: normal tone; nontender, guaiac negative stool; non inflamed external hemorrhoid noted     GENITOURINARY: Pap smear taken;  external genitalia: normal without lesions or urethral abnormalities;; vagina: atrophic mucosa; ; cervix: endocervical polyp noted;;  uterus: normal size and position, well-supported;;  adnexa: normal with no masses or tenderness     LYMPHATIC: no enlargement of cervical or facial nodes; no axillary adenopathy;     BREAST/INTEGUMENT: BREASTS: breast exam is normal without masses, skin changes, or nipple discharge; SKIN: no significant rashes or lesions; no suspicious moles;     MUSCULOSKELETAL:  Normal range of motion, strength and tone;     NEUROLOGIC: GROSSLY INTACT     PSYCHIATRIC:  appropriate affect and demeanor; normal speech pattern; grossly normal memory;         Lab/Test Results:             Glucose, Urine:  Neg (10/18/2018),     Bilirubin, urine:  Negative (10/18/2018),     Ketones, Urine Strip:  Negative (10/18/2018),     Specific Gravity, urine:  1.015 (10/18/2018),     Blood in Urine:  small (10/18/2018),     pH, urine:  7.0 (10/18/2018),     Protein Urine QL:  negative (10/18/2018),     Urobilinogen, urine:  0.2 E.U./dL (10/18/2018),     Nitrite,  Urine:  Negative (10/18/2018),     Leukoctyes, urine:  Trace (10/18/2018),     Appearance:  Clear (10/18/2018),     collection source:  Clean-catch (10/18/2018),     Color:  Yellow (10/18/2018),     Performed by::  tereza (10/18/2018),             Procedures:     Vaccination against viral hepatitis     1. Hepatitis A (adult): 1.0 ml given IM in the right upper arm; administered by          Vaccination against other viral diseases, Influenza     1. Influenza, seasonal PF (children 3 years to adult): 0.5 ml unit dose given IM in the left upper arm; administered by  Regarding contraindications to an Influenza vaccine: Denies moderate/severe illness with/without fever; serious reaction to eggs, egg proteins, gentamicin, gelatin, arginine, neomycin or polymixin; serious reaction after recieving previous influenza vaccines; and history of Guillain-Goodfield Syndrome.              ASSESSMENT:           V72.31     Well Woman Exam     V79.0   Z13.89  Screening for depression              DDx:     V76.41   Z12.12  Screening for rectal cancer              DDx:     V05.3   Z23  Vaccination against viral hepatitis              DDx:     V04.81   Z23  Vaccination against other viral diseases, Influenza              DDx:     V05.8   Z23  Vaccination against other specified disease              DDx:     V73.89   Z11.59  Screening test for viral diseases - other              DDx:         ORDERS:         Radiology/Test Orders:       [id number]  Screening digital breast tomosynthesis bi  (Send-Out)           Lab Orders:       [id number]  Cytopathology, slides, cervical or vaginal; manual screening under MD supervision  (Send-Out)         34794  Urinalysis, automated, without microscopy  (In-House)         [facility]  [facility]: CMP, CBC, TSH, LIPID: [id number], [id number], [id number], [id number]  (Send-Out)         83699  Occult blood, fecal  (In-House)         [facility]  [facility] Hepatitis C antibody  (Send-Out)           Procedures Ordered:       REFER  Referral  to Specialist or Other Facility  (Send-Out)         86588  Handlg&/or convey of spec for TR office to lab  (In-House)         81544  Hepatitis A vaccine, adult dosage, for intramuscular use  (In-House)         01393  Immunization administration; one vaccine  (In-House)         17469  Immunization administration; each additional vaccine/toxoid  (In-House)           Other Orders:         Negative EtOH screen  (In-House)         54963  Influenza virus vaccine, quadrivalent, split virus, preservative free 3 years of age & older  (In-House)                   PLAN:          Well Woman Exam get flu and hep a today, will get adacel in 4-2019, and will return for shingles vaccine /she has an appt at [facility] for mammogram 11-14-18 /order given /discussed vit d and calcium supplements, declined testing and does not like to take rx's     LABORATORY:  Labs ordered to be performed today include PHYSICAL PANEL; CMP, CBC, TSH, LIPID.      FOLLOW-UP TESTING #1:    RADIOLOGY:  I have ordered Screening 3D Mammogram Bilateral to be done today.      COUNSELING was provided today regarding the following topics: healthy eating habits and breast self-exam.      FOLLOW-UP: Schedule follow-up appointments on a p.r.n. basis.            Orders:      50332  Cytopathology, slides, cervical or vaginal; manual screening under MD supervision  (Send-Out)         36891  Urinalysis, automated, without microscopy  (In-House)         80805  [facility] PHYSICAL: CMP, CBC, TSH, LIPID: [id number], [id number], [id number], 23233  (Send-Out)         16210  Screening digital breast tomosynthesis bi  (Send-Out)         12369  Handlg&/or convey of spec for TR office to lab  (In-House)             Patient Education Handouts:       Hepatitis A           Screening for depression     MIPS PHQ-9 Depression Screening: Completed form scanned and in chart; Total Score 2 Negative alcohol screen           Orders:         Negative EtOH screen  (In-House)            Screening  for rectal cancer wants to see Goyo again         REFERRALS:  Referral initiated to a general surgeon ( Dr. Favian Nance; a colonoscopy ).            Orders:       12920  Occult blood, fecal  (In-House)   X 1 @ [facility]       REFER  Referral to Specialist or Other Facility  (Send-Out)            Vaccination against viral hepatitis             FOLLOW-UP: Schedule a follow-up appointment in 6 months.            Orders:       17122  Hepatitis A vaccine, adult dosage, for intramuscular use  (In-House)         06553  Immunization administration; each additional vaccine/toxoid  (In-House)            Vaccination against other viral diseases, Influenza           Orders:       92299  Influenza virus vaccine, quadrivalent, split virus, preservative free 3 years of age & older  (In-House)         39530  Immunization administration; one vaccine  (In-House)            Vaccination against other specified disease     PT TO RETURN TO [facility] TO RECEIVE SHINGRIX VACCINE AT PATIENTS OWN CONVENIENCE, PER REINA MEJIAS/tereza          Screening test for viral diseases - other     LABORATORY:  Labs ordered to be performed today include Hepatitis C Antibody.            Orders:       76597  \[facility]"" - [facility] Hepatitis C antibody  (Send-Out)               Patient Recommendations:        For  Well Woman Exam:         Limit dietary intake of fat (especially saturated fat) and cholesterol.  Eat a variety of foods, including plenty of fruits, vegetables, and grain containg fiber, limit fat intake to 30% of total calories. Balance caloric intake with energy expended.    You should regularly examine your breasts, easily done while in the shower or with lotion.  Feel and look for differences in consistency from month to month, especially noting knots or lumps, changes in skin appearance, nipple retraction or discharge.  Schedule follow-up appointments as needed.          For  Vaccination against viral hepatitis:                 FOLLOW-UP: Schedule a  follow-up visit in 6 months.              CHARGE CAPTURE:           Primary Diagnosis:     V72.31    Well Woman Exam                Z01.419    Encounter for gynecological examination (general) (routine) without abnormal findings                       Orders:          58124   Preventive medicine, established patient, age 40-64 years  (In-House)             99469   Urinalysis, automated, without microscopy  (In-House)             23499   Handlg&/or convey of spec for TR office to lab  (In-House)           V79.0 Screening for depression            Z13.89    Encounter for screening for other disorder              Orders:             Negative EtOH screen  (In-House)           V76.41 Screening for rectal cancer            Z12.12    Encounter for screening for malignant neoplasm of rectum              Orders:          77723   Occult blood, fecal  (In-House)           V05.3 Vaccination against viral hepatitis            Z23    Encounter for immunization              Orders:          18187   Hepatitis A vaccine, adult dosage, for intramuscular use  (In-House)             16709   Immunization administration; each additional vaccine/toxoid  (In-House)           V04.81 Vaccination against other viral diseases, Influenza            Z23    Encounter for immunization              Orders:          40268   Influenza virus vaccine, quadrivalent, split virus, preservative free 3 years of age & older  (In-House)             87357   Immunization administration; one vaccine  (In-House)           V05.8 Vaccination against other specified disease            Z23    Encounter for immunization    V73.89 Screening test for viral diseases - other            Z11.59    Encounter for screening for other viral diseases        ADDENDUMS:      ____________________________________    Addendum: 10/18/2018 03:40 PM - Berenice Porter         Visit Note Faxed to:        Favian Nance  (General Surgery); Number [phone]

## 2021-05-18 NOTE — PROGRESS NOTES
Sayra Brock 1955     Office/Outpatient Visit    Visit Date: Mon, Oct 21, 2019 08:21 am    Provider: Shreya Hare N.P. (Assistant: Daya Lake)    Location: [facility]        Electronically signed by Shreya Hare N.P. on  10/22/2019 07:35:48 AM                             SUBJECTIVE:        CC:     Nadia is a 63 year old White female.  She presents with WWE.          HPI:         Patient complains of well Woman Exam.  She cannot recall when she last had a physical exam.  She is menopausal.  She does not perform breast self-exams.  She is current with her Td and influenza immunization.  Nadia denies any history of abnormal Pap smears.  Tobacco: She has a past history of cigarette smoking; quit date:  2009.              PHQ-9 Depression Screening: Completed form scanned and in chart; Total Score 2     ROS:     CONSTITUTIONAL:  Negative for chills, fatigue, fever, and weight change.      EYES:  Negative for blurred vision, eye pain, and photophobia.      E/N/T:  Negative for hearing problems, E/N/T pain, congestion, rhinorrhea, epistaxis, hoarseness, and dental problems.      CARDIOVASCULAR:  Negative for chest pain, palpitations, tachycardia, orthopnea, and edema.      RESPIRATORY:  Positive for persistent cough ( typically dry; >2 ).  was a smoker, smoked starting at age 18 and smoked prn to as much as 2 ppd     GASTROINTESTINAL:  Negative for abdominal pain, heartburn, constipation, diarrhea, and stool changes.      GENITOURINARY:  Positive for red itchy skin in right pubic area / had for over a year, comes and goes.   Negative for post-menopausal vaginal bleeding or vaginal discharge.      MUSCULOSKELETAL:  Negative for arthralgias, back pain, and myalgias.      INTEGUMENTARY/BREAST:  Positive for dry cracked heels, needs refill of Econazle cream and skin lesion on her right shouler she wants checked, are irritated by her bra.   Negative for performance of self breast exams.       NEUROLOGICAL:  Negative for dizziness, headaches, paresthesias, and weakness.      ENDOCRINE:  Negative for hair loss, heat/cold intolerance, polydipsia, and polyphagia.      PSYCHIATRIC:  Negative for anxiety, depression, and sleep disturbances.          PMH/FMH/SH:     Last Reviewed on 10/21/2019 08:47 AM by Shreya Hare    Past Medical History:             GYNECOLOGICAL HISTORY:        Menopause at age 55.          PREVENTIVE HEALTH MAINTENANCE             COLORECTAL CANCER SCREENING: Up to date (colonoscopy q10y; sigmoidoscopy q5y; Cologuard q3y) was last done 19, Results are in chart; colonoscopy with normal results     Hepatitis C Medicare Screening: was last done 10-18-18; negative         Surgical History:         Tonsillectomy + Adenoidectomy       section: X 2;     Breast Bs age 21; Procedures: colonoscopy /hemorrhoid         Family History:     Father:  at age 82; Cause of death was AAA;  Congestive Heart Failure;  Renal Failure ( age 82 ); ;  Type 2 Diabetes     Mother:  at age 82; Cause of death was ALS/Sierra Gerhrig's;  Breast Cancer ( [place] );     Brother(s): 7 brother(s) total;  Coronary Artery Disease; Hypertension     Sister(s): 2 sister(s) total;  Hypertension;  Anxiety     Son(s): 3 son(s) total     Daughter(s): Healthy; 1 daughter(s) total     Positive for Coronary Artery Disease ( pat. uncle; pat. aunt ).      Positive for Pancreatic Cancer ( pat. aunt; paternal cousins X 2 );         Social History: daughter is [employer] /MD oncologist in [place], [place]     Occupation: Homemaker     Marital Status:      Children: 4 children         Tobacco/Alcohol/Supplements:     Last Reviewed on 10/21/2019 08:30 AM by Daya Lake    Tobacco: She has a past history of cigarette smoking; quit date:  -   pack pack yr.  Non-drinker         Substance Abuse History:     None             Immunizations:     Havrix -adult dose (HepA) 10/18/2018     Hep A,  adult dose 4/15/2019     Fluzone Quadrivalent (3+ years) 10/18/2018     Fluzone Quadrivalent (3+ years) 10/3/2019     Flucelvax pf 1/16/2014     Adacel (Tdap) 4/7/2009         Allergies:     Last Reviewed on 10/21/2019 08:21 AM by Daya Lake      No Known Drug Allergies.         Current Medications:     Last Reviewed on 10/21/2019 08:32 AM by Daya Lake      No Known Medications.         OBJECTIVE:        Vitals:         Current: 10/21/2019 8:31:49 AM    Ht:  5 ft, 2.75 in;  Wt: 149 lbs;  BMI: 26.6    T: 97.9 F (oral);  BP: 119/61 mm Hg (left arm, sitting);  P: 54 bpm (left arm (BP Cuff), sitting);  sCr: 0.69 mg/dL;  GFR: 83.64    O2 Sat: 100 % (room air)        Exams:     PHYSICAL EXAM:     GENERAL: vital signs recorded - well developed, well nourished;  no apparent distress;     EYES: extraocular movements intact; conjunctiva and cornea are normal; PERRLA;     E/N/T:  normal EACs, TMs, nasal/oral mucosa, teeth, gingiva, and oropharynx;     NECK: range of motion is normal; thyroid is non-palpable;     RESPIRATORY: normal respiratory rate and pattern with no distress; normal breath sounds with no rales, rhonchi, wheezes or rubs;     CARDIOVASCULAR: normal rate; rhythm is regular;  no systolic murmur; no edema;     GASTROINTESTINAL: nontender; normal bowel sounds; no organomegaly; rectal exam: normal tone; nontender, guaiac negative stool;     GENITOURINARY: Pap smear taken;  external genitalia: normal without lesions or urethral abnormalities;;  vagina: normal with good pelvic support and no lesions or discharge;; cervix: endocervical polyp noted;;  uterus: normal size and position, well-supported;;  adnexa: normal with no masses or tenderness     LYMPHATIC: no enlargement of cervical or facial nodes; no axillary adenopathy;     BREAST/INTEGUMENT: SKIN: no significant rashes or lesions; no suspicious moles; atypical mole(s) mole is 3-4 mm in size, located on the right mid shoulder, and enlarging in  size (by patient history);  callus on bilateral great toes; dry cracked heels;  a rash is noted right pubic area, maculopapular faint erythema; right great toe overlapping second toe     MUSCULOSKELETAL:  Normal range of motion, strength and tone;     NEUROLOGIC: GROSSLY INTACT     PSYCHIATRIC:  appropriate affect and demeanor; normal speech pattern; grossly normal memory;         Lab/Test Results:             Glucose, Urine:  Neg (10/21/2019),     Bilirubin, urine:  Negative (10/21/2019),     Ketones, Urine Strip:  Negative (10/21/2019),     Specific Gravity, urine:  1.025 (10/21/2019),     Blood in Urine:  small (10/21/2019),     pH, urine:  6.5 (10/21/2019),     Protein Urine QL:  negative (10/21/2019),     Urobilinogen, urine:  0.2 E.U./dL (10/21/2019),     Nitrite, Urine:  Negative (10/21/2019),     Leukoctyes, urine:  Negative (10/21/2019),     Appearance:  Clear (10/21/2019),     collection source:  Clean-catch (10/21/2019),     Color:  Yellow (10/21/2019),     Performed by::  tls (10/21/2019),             Procedures:     Vaccination against other specified disease     1. Shingrix (shingles): 0.5 ml unit dose given IM in the right upper arm; administered by AMB;  lot number Lot Number:[id number]; expires Expiration date 11/08/2021             ASSESSMENT:           V72.31     Well Woman Exam     V79.0   Z13.89  Screening for depression              DDx:     786.2   R05  Cough              DDx:     268.9   E55.9  Vitamin D deficiency              DDx:     V05.8   Z23  Vaccination against other specified disease              DDx:     V76.41   Z12.12  Screening for rectal cancer              DDx:     782.1   R21  Rash              DDx:         ORDERS:         Meds Prescribed:       Econazole Nitrate 1% Cream Apply sufficient amount to affected area bid  #30 (Thirty) gm Refills: 1       Nystatin/Triamcinolone 100,000U/1gm/0.1% Cream apply thin film BID prn  #60 (Sixty) gm Refills: 0         Radiology/Test Orders:        33146  Screening digital breast tomosynthesis bi  (Send-Out)           Lab Orders:       [id number]  Cytopathology, slides, cervical or vaginal; manual screening under MD supervision  (Send-Out)         82468  Urinalysis, automated, without microscopy  (In-House)         75073  [facility] PHYSICAL: CMP, CBC, TSH, LIPID: [id number], [id number], [id number], 29361  (Send-Out)         15946  VITD - [facility] Vitamin D, 25 Hydroxy  (Send-Out)         04024  Occult blood, fecal  (In-House)           Procedures Ordered:       17128  Handlg&/or convey of spec for TR office to lab  (In-House)         71861  Immunization administration; one vaccine  (In-House)         REFER  Referral to Specialist or Other Facility  (Send-Out)           Other Orders:       31156  Zoster Shingles (Shingrix)  (In-House)                   PLAN:          Well Woman Exam     LABORATORY:  Labs ordered to be performed today include PHYSICAL PANEL; CMP, CBC, TSH, LIPID.      FOLLOW-UP TESTING #1:    RADIOLOGY:  I have ordered Screening 3D Mammogram Bilateral to be done today.      COUNSELING was provided today regarding the following topics: breast self-exam.            Orders:      97033  Cytopathology, slides, cervical or vaginal; manual screening under MD supervision  (Send-Out)         32095  Urinalysis, automated, without microscopy  (In-House)         13500  Handlg&/or convey of spec for TR office to lab  (In-House)         97293  [facility] PHYSICAL: CMP, CBC, TSH, LIPID: [id number], [id number], 32922, 43259  (Send-Out)         78601  Screening digital breast tomosynthesis bi  (Send-Out)            Screening for depression     MIPS PHQ-9 Depression Screening: Completed form scanned and in chart; Total Score 2          Cough see if we can do a high dose CT of chest or may do a CXR          Vitamin D deficiency not taking any vit d supplement, reviewed past labs and will recheck lab     LABORATORY:  Labs ordered to be performed today include Vitamin D.            Orders:        21572  VITD - [facility] Vitamin D, 25 Hydroxy  (Send-Out)            Vaccination against other specified disease she has her flu vaccine this year         FOLLOW-UP:.  :for second shinrex vaccine           Orders:       77369  Immunization administration; one vaccine  (In-House)         90690  Zoster Shingles (Shingrix)  (In-House)            Screening for rectal cancer           Orders:       28422  Occult blood, fecal  (In-House)   X 1          Rash referral to derm for skin lesion evaluation         REFERRALS:  Referral initiated to a dermatologist ( Dr. Nina Cooper; for evaluation of rash, skin lesion ).            Prescriptions:       Econazole Nitrate 1% Cream Apply sufficient amount to affected area bid  #30 (Thirty) gm Refills: 1       Nystatin/Triamcinolone 100,000U/1gm/0.1% Cream apply thin film BID prn  #60 (Sixty) gm Refills: 0           Orders:       REFER  Referral to Specialist or Other Facility  (Send-Out)               Patient Recommendations:        For  Well Woman Exam:         You should regularly examine your breasts, easily done while in the shower or with lotion.  Feel and look for differences in consistency from month to month, especially noting knots or lumps, changes in skin appearance, nipple retraction or discharge.          For  Vaccination against other specified disease:                     APPOINTMENT INFORMATION:        Monday Tuesday Wednesday Thursday Friday Saturday Sunday            Time:___________________AM  PM   Date:_____________________             CHARGE CAPTURE:           Primary Diagnosis:     V72.31    Well Woman Exam                Z01.419    Encounter for gynecological examination (general) (routine) without abnormal findings                       Orders:          71608   Preventive medicine, established patient, age 40-64 years  (In-House)             17609   Urinalysis, automated, without microscopy  (In-House)             52417   Handlg&/or convey of spec for TR  office to lab  (In-House)           V79.0 Screening for depression            Z13.89    Encounter for screening for other disorder    786.2 Cough            R05    Cough    268.9 Vitamin D deficiency            E55.9    Vitamin D deficiency, unspecified    V05.8 Vaccination against other specified disease            Z23    Encounter for immunization              Orders:          78675   Immunization administration; one vaccine  (In-House)             52785   Zoster Shingles (Shingrix)  (In-House)           V76.41 Screening for rectal cancer            Z12.12    Encounter for screening for malignant neoplasm of rectum              Orders:          63637   Occult blood, fecal  (In-House)           782.1 Rash            R21    Rash and other nonspecific skin eruption

## 2021-07-01 VITALS
HEIGHT: 63 IN | WEIGHT: 150.4 LBS | DIASTOLIC BLOOD PRESSURE: 69 MMHG | TEMPERATURE: 97.9 F | HEART RATE: 52 BPM | SYSTOLIC BLOOD PRESSURE: 135 MMHG | BODY MASS INDEX: 26.65 KG/M2

## 2021-07-01 VITALS
TEMPERATURE: 97.9 F | BODY MASS INDEX: 26.4 KG/M2 | DIASTOLIC BLOOD PRESSURE: 61 MMHG | OXYGEN SATURATION: 100 % | HEART RATE: 54 BPM | SYSTOLIC BLOOD PRESSURE: 119 MMHG | WEIGHT: 149 LBS | HEIGHT: 63 IN

## 2021-07-02 VITALS
BODY MASS INDEX: 26.65 KG/M2 | TEMPERATURE: 98.3 F | DIASTOLIC BLOOD PRESSURE: 67 MMHG | HEIGHT: 63 IN | HEART RATE: 56 BPM | WEIGHT: 150.4 LBS | SYSTOLIC BLOOD PRESSURE: 132 MMHG

## 2021-08-31 RX ORDER — ESTRADIOL 0.1 MG/G
1 CREAM VAGINAL 2 TIMES WEEKLY
Qty: 42.5 G | Refills: 2 | Status: SHIPPED | OUTPATIENT
Start: 2021-09-02 | End: 2021-09-13 | Stop reason: SDUPTHER

## 2021-08-31 NOTE — TELEPHONE ENCOUNTER
Rx Refill Note  Requested Prescriptions     Pending Prescriptions Disp Refills   • estradiol (ESTRACE) 0.1 MG/GM vaginal cream 42.5 g 2     Sig: Insert 1 g into the vagina 2 (Two) Times a Week.      Last office visit with prescribing clinician: 1/28/2021      Next office visit with prescribing clinician: 2/3/2022            Karo Forrest MA  08/31/21, 09:41 EDT

## 2021-09-13 ENCOUNTER — TELEPHONE (OUTPATIENT)
Dept: OBSTETRICS AND GYNECOLOGY | Facility: CLINIC | Age: 66
End: 2021-09-13

## 2021-09-13 RX ORDER — ESTRADIOL 0.1 MG/G
1 CREAM VAGINAL 2 TIMES WEEKLY
Qty: 42.5 G | Refills: 2 | Status: SHIPPED | OUTPATIENT
Start: 2021-09-13 | End: 2022-04-11 | Stop reason: SDUPTHER

## 2021-09-13 NOTE — TELEPHONE ENCOUNTER
Rx Refill Note [facility] has requested refill on estradiol vaginal cream.  Requested Prescriptions      No prescriptions requested or ordered in this encounter      Last office visit with prescribing clinician: 1/28/2021      Next office visit with prescribing clinician: 2/3/2022            Karo Forrest MA  09/13/21, 10:19 EDT

## 2022-03-15 ENCOUNTER — TRANSCRIBE ORDERS (OUTPATIENT)
Dept: ADMINISTRATIVE | Facility: HOSPITAL | Age: 67
End: 2022-03-15

## 2022-03-15 DIAGNOSIS — Z12.31 BREAST CANCER SCREENING BY MAMMOGRAM: Primary | ICD-10-CM

## 2022-04-11 ENCOUNTER — OFFICE VISIT (OUTPATIENT)
Dept: OBSTETRICS AND GYNECOLOGY | Facility: CLINIC | Age: 67
End: 2022-04-11

## 2022-04-11 VITALS
DIASTOLIC BLOOD PRESSURE: 80 MMHG | WEIGHT: 155.4 LBS | HEIGHT: 64 IN | BODY MASS INDEX: 26.53 KG/M2 | SYSTOLIC BLOOD PRESSURE: 138 MMHG

## 2022-04-11 DIAGNOSIS — N95.2 ATROPHY OF VAGINA: ICD-10-CM

## 2022-04-11 DIAGNOSIS — Z01.419 ENCOUNTER FOR GYNECOLOGICAL EXAMINATION WITHOUT ABNORMAL FINDING: Primary | ICD-10-CM

## 2022-04-11 PROCEDURE — 99396 PREV VISIT EST AGE 40-64: CPT | Performed by: NURSE PRACTITIONER

## 2022-04-11 RX ORDER — ALENDRONATE SODIUM 70 MG/1
1 TABLET ORAL WEEKLY
COMMUNITY
Start: 2022-02-14

## 2022-04-11 RX ORDER — PHENOL 1.4 %
600 AEROSOL, SPRAY (ML) MUCOUS MEMBRANE DAILY
COMMUNITY

## 2022-04-11 RX ORDER — ESTRADIOL 0.1 MG/G
1 CREAM VAGINAL 2 TIMES WEEKLY
Qty: 42.5 G | Refills: 2 | Status: SHIPPED | OUTPATIENT
Start: 2022-04-11 | End: 2022-11-04 | Stop reason: SDUPTHER

## 2022-04-11 NOTE — PROGRESS NOTES
"Chief Complaint  Sayra Brock is a 66 y.o.  female presenting for Annual Exam and Med Refill (Patient will call for estradiol vaginal cream refills.  Not needed at this time.)    History of Present Illness  Pleasant 65yo woman, former pt of Dr. Greene, here today for annual gyn exam.  Her PCP prescribes the bisphosphonate for BMD.  The vaginal ERT is working well without any bothersome side effects.      The following portions of the patient's history were reviewed and updated as appropriate: allergies, current medications, past family history, past medical history, past social history, past surgical history and problem list.    No Known Allergies      Current Outpatient Medications:   •  estradiol (ESTRACE VAGINAL) 0.1 MG/GM vaginal cream, Insert 1 g into the vagina 2 (Two) Times a Week., Disp: 42.5 g, Rfl: 2  •  alendronate (FOSAMAX) 70 MG tablet, Take 1 tablet by mouth 1 (One) Time Per Week., Disp: , Rfl:   •  calcium carbonate (OS-CARLOS) 600 MG tablet, Take 600 mg by mouth Daily., Disp: , Rfl:   •  vitamin D3 (Vitamin D) 125 MCG (5000 UT) capsule capsule, Take 5,000 Units by mouth Daily., Disp: , Rfl:     Past Medical History:   Diagnosis Date   • Menopause    • Vitamin D deficiency         Past Surgical History:   Procedure Laterality Date   • BREAST BIOPSY     •  SECTION      X 2   • D & C HYSTEROSCOPY MYOSURE     • D & C WITH SUCTION         Objective  /80   Ht 162.6 cm (64\")   Wt 70.5 kg (155 lb 6.4 oz)   Breastfeeding No   BMI 26.67 kg/m²     Physical Exam  Exam conducted with a chaperone present.   Constitutional:       Appearance: Normal appearance.   HENT:      Head: Normocephalic.   Neck:      Thyroid: No thyroid mass or thyromegaly.   Cardiovascular:      Rate and Rhythm: Normal rate and regular rhythm.      Heart sounds: Normal heart sounds.   Pulmonary:      Effort: Pulmonary effort is normal.      Breath sounds: Normal breath sounds.   Chest:   Breasts:      Right: No " inverted nipple, mass, nipple discharge, axillary adenopathy or supraclavicular adenopathy.      Left: No inverted nipple, mass, nipple discharge, axillary adenopathy or supraclavicular adenopathy.       Abdominal:      Palpations: Abdomen is soft. There is no mass.      Tenderness: There is no abdominal tenderness.   Genitourinary:     General: Normal vulva.      Labia:         Right: No lesion.         Left: No lesion.       Vagina: Normal. No erythema.      Cervix: No discharge, lesion or erythema.      Uterus: Not enlarged and not tender.       Adnexa:         Right: No mass or tenderness.          Left: No mass or tenderness.        Comments: Pap taken.  The local vaginal estrogen tx is working well to control atrophy.    Anus appears wnl.  No rectal exam performed.  Lymphadenopathy:      Upper Body:      Right upper body: No supraclavicular or axillary adenopathy.      Left upper body: No supraclavicular or axillary adenopathy.   Neurological:      Mental Status: She is alert.         Assessment/Plan   Diagnoses and all orders for this visit:    1. Encounter for gynecological examination without abnormal finding (Primary)    2. Atrophy of vagina    Other orders  -     estradiol (ESTRACE VAGINAL) 0.1 MG/GM vaginal cream; Insert 1 g into the vagina 2 (Two) Times a Week.  Dispense: 42.5 g; Refill: 2        Procedures    40 to 64: Counseling/Anticipatory Guidance Discussed: screenings and self-breast exam        Return in about 1 year (around 4/11/2023) for Annual physical.    Mickie Espinosa, SERENITY  04/11/2022

## 2022-04-29 ENCOUNTER — HOSPITAL ENCOUNTER (OUTPATIENT)
Dept: MAMMOGRAPHY | Facility: HOSPITAL | Age: 67
Discharge: HOME | End: 2022-04-29
Admitting: INTERNAL MEDICINE

## 2022-04-29 DIAGNOSIS — Z12.31 BREAST CANCER SCREENING BY MAMMOGRAM: ICD-10-CM

## 2022-04-29 PROCEDURE — 77063 BREAST TOMOSYNTHESIS BI: CPT

## 2022-04-29 PROCEDURE — 77067 SCR MAMMO BI INCL CAD: CPT

## 2022-11-04 RX ORDER — ESTRADIOL 0.1 MG/G
1 CREAM VAGINAL 2 TIMES WEEKLY
Qty: 42.5 G | Refills: 2 | Status: SHIPPED | OUTPATIENT
Start: 2022-11-07

## 2022-11-04 NOTE — TELEPHONE ENCOUNTER
The patient is calling regarding prescription.  She states that she called in October to ask for refills.  This call is not documented and no prescription was sent.  Prescription has been sent today and patient will check with [facility] to make sure they received it (our records indicate prescription was received at 1:55 pm today).

## 2022-11-04 NOTE — TELEPHONE ENCOUNTER
Caller: Vinod Sayra MARTINEZ    Relationship: Self    Best call back number: [phone]    Requested Prescriptions:   Requested Prescriptions     Pending Prescriptions Disp Refills   • estradiol (ESTRACE VAGINAL) 0.1 MG/GM vaginal cream 42.5 g 2     Sig: Insert 1 g into the vagina 2 (Two) Times a Week.        [facility] [phone]     Additional details provided by patient: [facility] IS HAVING TROUBLE GETTING REFILLS    Does the patient have less than a 3 day supply:  [x] Yes  [] No

## 2023-01-04 ENCOUNTER — TRANSCRIBE ORDERS (OUTPATIENT)
Dept: ADMINISTRATIVE | Facility: HOSPITAL | Age: 68
End: 2023-01-04
Payer: COMMERCIAL

## 2023-01-04 DIAGNOSIS — Z12.31 SCREENING MAMMOGRAM FOR HIGH-RISK PATIENT: Primary | ICD-10-CM

## 2023-04-12 ENCOUNTER — OFFICE VISIT (OUTPATIENT)
Dept: OBSTETRICS AND GYNECOLOGY | Facility: CLINIC | Age: 68
End: 2023-04-12
Payer: MEDICARE

## 2023-04-12 VITALS
BODY MASS INDEX: 26.7 KG/M2 | SYSTOLIC BLOOD PRESSURE: 130 MMHG | HEIGHT: 64 IN | WEIGHT: 156.4 LBS | DIASTOLIC BLOOD PRESSURE: 80 MMHG

## 2023-04-12 DIAGNOSIS — N95.2 ATROPHY OF VAGINA: ICD-10-CM

## 2023-04-12 DIAGNOSIS — Z01.411 ENCOUNTER FOR GYNECOLOGICAL EXAMINATION WITH ABNORMAL FINDING: Primary | ICD-10-CM

## 2023-04-12 DIAGNOSIS — N89.8 VAGINAL DISCHARGE: ICD-10-CM

## 2023-04-12 RX ORDER — ESTRADIOL 10 UG/1
1 INSERT VAGINAL 2 TIMES WEEKLY
Qty: 24 TABLET | Refills: 3 | Status: SHIPPED | OUTPATIENT
Start: 2023-04-13

## 2023-04-12 RX ORDER — ESTRADIOL 10 UG/1
1 INSERT VAGINAL 2 TIMES WEEKLY
Qty: 24 TABLET | Refills: 3 | Status: SHIPPED | OUTPATIENT
Start: 2023-04-13 | End: 2023-04-12 | Stop reason: SDUPTHER

## 2023-04-12 NOTE — PROGRESS NOTES
"Chief Complaint  Sayra Brock is a 67 y.o.  female presenting for Gynecologic Exam (C/O vulvar irritation, sometimes has itching.) and Med Refill (Will call for refills on estradiol vaginal cream.)    History of Present Illness  Sayra is a pleasant 66yo woman, , here for gyn exam.  She is using the vaginal estradiol cream faithfully twice/ weekly.  She doesn't like the applicator.  Sometimes feels a little itching on the external genitalia/ labial area.  And sometimes notes just a little heavier discharge.  She is due for DEXA scan in July; alendronate mgt done elsewhere.  She is intentional re: calcium & vit D intake.  Otherwise, ROS negative.  Pap, mammogram, and colonoscopy all up to date.      The following portions of the patient's history were reviewed and updated as appropriate: allergies, current medications, past family history, past medical history, past social history, past surgical history and problem list.    No Known Allergies      Current Outpatient Medications:   •  alendronate (FOSAMAX) 70 MG tablet, Take 1 tablet by mouth 1 (One) Time Per Week., Disp: , Rfl:   •  calcium carbonate (OS-CARLOS) 600 MG tablet, Take 600 mg by mouth Daily., Disp: , Rfl:   •  [START ON 2023] estradiol (Vagifem) 10 MCG tablet vaginal tablet, Insert 1 tablet into the vagina 2 (Two) Times a Week., Disp: 24 tablet, Rfl: 3  •  vitamin D3 (Vitamin D) 125 MCG (5000 UT) capsule capsule, Take 5,000 Units by mouth Daily., Disp: , Rfl:     Past Medical History:   Diagnosis Date   • Menopause    • Osteopenia    • Vitamin D deficiency         Past Surgical History:   Procedure Laterality Date   • BREAST BIOPSY     •  SECTION      X 2   • D & C HYSTEROSCOPY MYOSURE     • D & C WITH SUCTION         Objective  /80   Ht 162.6 cm (64\")   Wt 70.9 kg (156 lb 6.4 oz)   Breastfeeding No   BMI 26.85 kg/m²     Physical Exam  Vitals and nursing note reviewed. Exam conducted with a chaperone present. "   Constitutional:       General: She is not in acute distress.     Appearance: Normal appearance. She is not ill-appearing.   HENT:      Head: Normocephalic.   Neck:      Thyroid: No thyroid mass or thyromegaly.   Cardiovascular:      Rate and Rhythm: Normal rate and regular rhythm.      Heart sounds: Normal heart sounds. No murmur heard.  Pulmonary:      Effort: Pulmonary effort is normal. No respiratory distress.      Breath sounds: Normal breath sounds.   Chest:   Breasts:     Right: No inverted nipple, mass or nipple discharge.      Left: No inverted nipple, mass or nipple discharge.   Abdominal:      Palpations: Abdomen is soft. There is no mass.      Tenderness: There is no abdominal tenderness.   Genitourinary:     General: Normal vulva.      Labia:         Right: No rash, tenderness or lesion.         Left: No rash, tenderness or lesion.       Vagina: Vaginal discharge and erythema present.      Cervix: No discharge, lesion or erythema.      Uterus: Not enlarged and not tender.       Adnexa:         Right: No mass or tenderness.          Left: No mass or tenderness.        Comments: Vulva pink, no lesions or fissures.   Only up at the apex of vaginal vault --- still with atrophic erythema & stippling; small amt yellowish discharge.  OneSwab taken.  Anus appears wnl.  No rectal exam performed.  Lymphadenopathy:      Upper Body:      Right upper body: No supraclavicular or axillary adenopathy.      Left upper body: No supraclavicular or axillary adenopathy.   Skin:     General: Skin is warm and dry.   Neurological:      Mental Status: She is alert and oriented to person, place, and time.   Psychiatric:         Mood and Affect: Mood normal.         Behavior: Behavior normal.         Assessment/Plan   Diagnoses and all orders for this visit:    1. Encounter for gynecological examination with abnormal finding (Primary)    2. Atrophy of vagina    Other orders  -     estradiol (Vagifem) 10 MCG tablet vaginal  tablet; Insert 1 tablet into the vagina 2 (Two) Times a Week.  Dispense: 24 tablet; Refill: 3    OneSwab taken (Aerobic panel & yeast panel)    Procedures    40 to 64: Counseling/Anticipatory Guidance Discussed: screenings and self-breast exam    Return in about 1 year (around 4/12/2024) for Annual physical.    Mickie Espinosa, SERENITY  04/12/2023

## 2023-04-12 NOTE — TELEPHONE ENCOUNTER
Patient was in the office today and states that Vagifem will be covered by insurance.  She desires this prescription and should be sent to [facility].  I will change to correct pharmacy.  Rx Refill Note  Requested Prescriptions      No prescriptions requested or ordered in this encounter      Last office visit with prescribing clinician: 4/12/2023   Last telemedicine visit with prescribing clinician: Visit date not found   Next office visit with prescribing clinician: 5/1/2024                         Would you like a call back once the refill request has been completed: [] Yes [] No    If the office needs to give you a call back, can they leave a voicemail: [] Yes [] No    Karo Forrest MA  04/12/23, 11:37 EDT

## 2023-04-18 RX ORDER — AMOXICILLIN AND CLAVULANATE POTASSIUM 875; 125 MG/1; MG/1
1 TABLET, FILM COATED ORAL EVERY 12 HOURS
Qty: 14 TABLET | Refills: 0 | Status: SHIPPED | OUTPATIENT
Start: 2023-04-18 | End: 2023-04-25

## 2023-05-01 ENCOUNTER — HOSPITAL ENCOUNTER (OUTPATIENT)
Dept: MAMMOGRAPHY | Facility: HOSPITAL | Age: 68
Discharge: HOME | End: 2023-05-01
Admitting: INTERNAL MEDICINE
Payer: MEDICARE

## 2023-05-01 DIAGNOSIS — Z12.31 SCREENING MAMMOGRAM FOR HIGH-RISK PATIENT: ICD-10-CM

## 2023-05-01 PROCEDURE — 77063 BREAST TOMOSYNTHESIS BI: CPT

## 2023-05-01 PROCEDURE — 77067 SCR MAMMO BI INCL CAD: CPT

## 2024-02-12 RX ORDER — ESTRADIOL 10 UG/1
1 INSERT VAGINAL 2 TIMES WEEKLY
Qty: 24 TABLET | Refills: 0 | Status: SHIPPED | OUTPATIENT
Start: 2024-02-12

## 2024-03-15 ENCOUNTER — TRANSCRIBE ORDERS (OUTPATIENT)
Dept: ADMINISTRATIVE | Facility: HOSPITAL | Age: 69
End: 2024-03-15
Payer: MEDICARE

## 2024-03-15 DIAGNOSIS — Z12.31 SCREENING MAMMOGRAM, ENCOUNTER FOR: Primary | ICD-10-CM

## 2024-05-01 ENCOUNTER — OFFICE VISIT (OUTPATIENT)
Dept: OBSTETRICS AND GYNECOLOGY | Facility: CLINIC | Age: 69
End: 2024-05-01
Payer: MEDICARE

## 2024-05-01 VITALS
BODY MASS INDEX: 27.62 KG/M2 | DIASTOLIC BLOOD PRESSURE: 76 MMHG | SYSTOLIC BLOOD PRESSURE: 128 MMHG | HEIGHT: 64 IN | WEIGHT: 161.8 LBS

## 2024-05-01 DIAGNOSIS — N95.2 ATROPHY OF VAGINA: ICD-10-CM

## 2024-05-01 DIAGNOSIS — Z01.411 ENCOUNTER FOR GYNECOLOGICAL EXAMINATION WITH ABNORMAL FINDING: Primary | ICD-10-CM

## 2024-05-01 RX ORDER — ESTRADIOL 10 UG/1
1 INSERT VAGINAL 2 TIMES WEEKLY
Qty: 24 TABLET | Refills: 3 | Status: SHIPPED | OUTPATIENT
Start: 2024-05-02

## 2024-05-01 NOTE — PROGRESS NOTES
"Chief Complaint  Sayra Brock is a 68 y.o.  female presenting for Gynecologic Exam and Med Refill (Estradiol (Vagifem) 10 mcg (#24 with refills).  [facility]. )    History of Present Illness  Sayra is a pleasant 68-year-old woman, G5, P4, here for annual GYN exam.  Her past surgical history includes a D&C with [facility], and  section delivery x 2.  She uses Vagifem for vaginal atrophy and feels that it is helpful.    She has a PMHx of osteopenia and Vit D deficiency.  States she has appt with PCP (Dr. Tashi Garrido in [place]) for next DEXA scan.  She uses Alendronate for BMD and tolerates the medication well.    Last pap smear 22, negative  Last mammogram 23, negative  (She has appt Friday for next mammo)  Last Cologuard in our chart is 2019.  She wishes to discuss this with PCP & he will order the next one.      The following portions of the patient's history were reviewed and updated as appropriate: allergies, current medications, past family history, past medical history, past social history, past surgical history, and problem list.    No Known Allergies      Current Outpatient Medications:     estradiol (Vagifem) 10 MCG tablet vaginal tablet, Insert 1 tablet into the vagina 2 (Two) Times a Week., Disp: 24 tablet, Rfl: 3    alendronate (FOSAMAX) 70 MG tablet, Take 1 tablet by mouth 1 (One) Time Per Week., Disp: , Rfl:     Past Medical History:   Diagnosis Date    Menopause     Osteopenia     Vitamin D deficiency         Past Surgical History:   Procedure Laterality Date    BREAST BIOPSY       SECTION      X 2    D & C HYSTEROSCOPY MYOSURE      D & C WITH SUCTION         Objective  /76   Ht 162.6 cm (64\")   Wt 73.4 kg (161 lb 12.8 oz)   Breastfeeding No   BMI 27.77 kg/m²     Physical Exam  Vitals and nursing note reviewed. Exam conducted with a chaperone present.   Constitutional:       General: She is not in acute distress.     Appearance: Normal appearance. She is not " ill-appearing.   HENT:      Head: Normocephalic.   Neck:      Thyroid: No thyroid mass or thyromegaly.   Cardiovascular:      Rate and Rhythm: Normal rate and regular rhythm.      Heart sounds: Normal heart sounds. No murmur heard.  Pulmonary:      Effort: Pulmonary effort is normal. No respiratory distress.      Breath sounds: Normal breath sounds.   Chest:   Breasts:     Right: No inverted nipple, mass or nipple discharge.      Left: No inverted nipple, mass or nipple discharge.   Abdominal:      Palpations: Abdomen is soft. There is no mass.      Tenderness: There is no abdominal tenderness.   Genitourinary:     General: Normal vulva.      Labia:         Right: No rash, tenderness or lesion.         Left: No rash, tenderness or lesion.       Vagina: Normal. No vaginal discharge or erythema.      Cervix: No discharge, lesion or erythema.      Uterus: Not enlarged and not tender.       Adnexa:         Right: No mass or tenderness.          Left: No mass or tenderness.        Comments: Mild atrophic erythema of the vaginal mucosa, but no stippling or abnormal discharge.    Anus appears wnl.  No rectal exam performed.  Lymphadenopathy:      Upper Body:      Right upper body: No supraclavicular or axillary adenopathy.      Left upper body: No supraclavicular or axillary adenopathy.   Skin:     General: Skin is warm and dry.   Neurological:      Mental Status: She is alert and oriented to person, place, and time.   Psychiatric:         Mood and Affect: Mood normal.         Behavior: Behavior normal.         Assessment/Plan   Diagnoses and all orders for this visit:    1. Encounter for gynecological examination with abnormal finding (Primary)    2. Atrophy of vagina    Other orders  -     estradiol (Vagifem) 10 MCG tablet vaginal tablet; Insert 1 tablet into the vagina 2 (Two) Times a Week.  Dispense: 24 tablet; Refill: 3        Procedures    40 to 64: Counseling/Anticipatory Guidance Discussed: nutrition, physical  activity, screenings, self-breast exam, and Atrophy and importance of the local med in helping to decrease risk for vaginitis and UTIs.  Also couns re: safety of the local medication.    Return in about 1 year (around 5/1/2025) for Annual physical.    Mickie Espinosa, APRN  05/01/2024

## 2024-05-03 ENCOUNTER — HOSPITAL ENCOUNTER (OUTPATIENT)
Dept: MAMMOGRAPHY | Facility: HOSPITAL | Age: 69
Discharge: HOME | End: 2024-05-03
Admitting: INTERNAL MEDICINE
Payer: MEDICARE

## 2024-05-03 DIAGNOSIS — Z12.31 SCREENING MAMMOGRAM, ENCOUNTER FOR: ICD-10-CM

## 2024-05-03 PROCEDURE — 77063 BREAST TOMOSYNTHESIS BI: CPT

## 2024-05-03 PROCEDURE — 77067 SCR MAMMO BI INCL CAD: CPT

## 2025-01-23 ENCOUNTER — TRANSCRIBE ORDERS (OUTPATIENT)
Dept: ADMINISTRATIVE | Facility: HOSPITAL | Age: 70
End: 2025-01-23
Payer: MEDICARE

## 2025-01-23 DIAGNOSIS — Z12.31 SCREENING MAMMOGRAM, ENCOUNTER FOR: Primary | ICD-10-CM

## 2025-05-05 ENCOUNTER — HOSPITAL ENCOUNTER (OUTPATIENT)
Dept: MAMMOGRAPHY | Facility: HOSPITAL | Age: 70
Discharge: HOME | End: 2025-05-05
Admitting: INTERNAL MEDICINE
Payer: MEDICARE

## 2025-05-05 DIAGNOSIS — Z12.31 SCREENING MAMMOGRAM, ENCOUNTER FOR: ICD-10-CM

## 2025-05-05 PROCEDURE — 77063 BREAST TOMOSYNTHESIS BI: CPT

## 2025-05-05 PROCEDURE — 77067 SCR MAMMO BI INCL CAD: CPT

## 2025-08-19 ENCOUNTER — OFFICE VISIT (OUTPATIENT)
Age: 70
End: 2025-08-19
Payer: MEDICARE

## 2025-08-19 VITALS
SYSTOLIC BLOOD PRESSURE: 134 MMHG | WEIGHT: 165.7 LBS | BODY MASS INDEX: 30.49 KG/M2 | HEIGHT: 62 IN | DIASTOLIC BLOOD PRESSURE: 64 MMHG

## 2025-08-19 DIAGNOSIS — N95.2 VAGINAL ATROPHY: Primary | ICD-10-CM

## 2025-08-19 PROCEDURE — 99459 PELVIC EXAMINATION: CPT | Performed by: STUDENT IN AN ORGANIZED HEALTH CARE EDUCATION/TRAINING PROGRAM

## 2025-08-19 PROCEDURE — 1159F MED LIST DOCD IN RCRD: CPT | Performed by: STUDENT IN AN ORGANIZED HEALTH CARE EDUCATION/TRAINING PROGRAM

## 2025-08-19 PROCEDURE — 1160F RVW MEDS BY RX/DR IN RCRD: CPT | Performed by: STUDENT IN AN ORGANIZED HEALTH CARE EDUCATION/TRAINING PROGRAM

## 2025-08-19 PROCEDURE — 99213 OFFICE O/P EST LOW 20 MIN: CPT | Performed by: STUDENT IN AN ORGANIZED HEALTH CARE EDUCATION/TRAINING PROGRAM

## 2025-08-19 RX ORDER — ESTRADIOL 10 UG/1
1 TABLET, FILM COATED VAGINAL 2 TIMES WEEKLY
Qty: 24 TABLET | Refills: 3 | Status: SHIPPED | OUTPATIENT
Start: 2025-08-21